# Patient Record
Sex: MALE | Race: WHITE | NOT HISPANIC OR LATINO | URBAN - METROPOLITAN AREA
[De-identification: names, ages, dates, MRNs, and addresses within clinical notes are randomized per-mention and may not be internally consistent; named-entity substitution may affect disease eponyms.]

---

## 2020-09-02 ENCOUNTER — OFFICE VISIT (OUTPATIENT)
Dept: URBAN - METROPOLITAN AREA TELEHEALTH 2 | Facility: TELEHEALTH | Age: 70
End: 2020-09-02

## 2020-09-03 ENCOUNTER — OFFICE VISIT (OUTPATIENT)
Dept: URBAN - METROPOLITAN AREA TELEHEALTH 2 | Facility: TELEHEALTH | Age: 70
End: 2020-09-03

## 2020-09-03 RX ORDER — INSULIN GLARGINE 100 [IU]/ML
INJECTION, SOLUTION SUBCUTANEOUS
Qty: 0 | Refills: 0 | Status: ACTIVE | COMMUNITY
Start: 1900-01-01 | End: 1900-01-01

## 2020-09-03 RX ORDER — ELVITEGRAVIR, COBICISTAT, EMTRICITABINE, AND TENOFOVIR ALAFENAMIDE 150; 150; 200; 10 MG/1; MG/1; MG/1; MG/1
TAKE 1 TABLET BY ORAL ROUTE ONCE DAILY WITH FOOD TABLET ORAL 1
Qty: 0 | Refills: 0 | Status: ACTIVE | COMMUNITY
Start: 1900-01-01 | End: 1900-01-01

## 2020-10-02 ENCOUNTER — OFFICE VISIT (OUTPATIENT)
Dept: URBAN - METROPOLITAN AREA CLINIC 84 | Facility: CLINIC | Age: 70
End: 2020-10-02

## 2020-10-23 ENCOUNTER — OFFICE VISIT (OUTPATIENT)
Dept: URBAN - METROPOLITAN AREA CLINIC 84 | Facility: CLINIC | Age: 70
End: 2020-10-23

## 2020-11-10 ENCOUNTER — WEB ENCOUNTER (OUTPATIENT)
Dept: URBAN - METROPOLITAN AREA CLINIC 84 | Facility: CLINIC | Age: 70
End: 2020-11-10

## 2020-11-10 ENCOUNTER — OFFICE VISIT (OUTPATIENT)
Dept: URBAN - METROPOLITAN AREA CLINIC 84 | Facility: CLINIC | Age: 70
End: 2020-11-10
Payer: MEDICARE

## 2020-11-10 DIAGNOSIS — K72.90 PORTOSYSTEMIC ENCEPHALOPATHY: ICD-10-CM

## 2020-11-10 DIAGNOSIS — K76.6 PORTAL HYPERTENSION: ICD-10-CM

## 2020-11-10 DIAGNOSIS — B20 HIV (HUMAN IMMUNODEFICIENCY VIRUS INFECTION): ICD-10-CM

## 2020-11-10 PROBLEM — 86406008 HUMAN IMMUNODEFICIENCY VIRUS INFECTION: Status: ACTIVE | Noted: 2020-11-10

## 2020-11-10 PROCEDURE — 3017F COLORECTAL CA SCREEN DOC REV: CPT | Performed by: INTERNAL MEDICINE

## 2020-11-10 PROCEDURE — 99214 OFFICE O/P EST MOD 30 MIN: CPT | Performed by: INTERNAL MEDICINE

## 2020-11-10 PROCEDURE — G9903 PT SCRN TBCO ID AS NON USER: HCPCS | Performed by: INTERNAL MEDICINE

## 2020-11-10 RX ORDER — ELVITEGRAVIR, COBICISTAT, EMTRICITABINE, AND TENOFOVIR ALAFENAMIDE 150; 150; 200; 10 MG/1; MG/1; MG/1; MG/1
TAKE 1 TABLET BY ORAL ROUTE ONCE DAILY WITH FOOD TABLET ORAL 1
Qty: 0 | Refills: 0 | Status: ACTIVE | COMMUNITY
Start: 1900-01-01

## 2020-11-10 RX ORDER — INSULIN GLARGINE 100 [IU]/ML
INJECTION, SOLUTION SUBCUTANEOUS
Qty: 0 | Refills: 0 | Status: ACTIVE | COMMUNITY
Start: 1900-01-01

## 2020-11-10 NOTE — HPI-TODAY'S VISIT:
Mr Barroso returns for a f/u visit. 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 He is s/p TIPS several years ago. EGD ast year showed small EV. Colonoscopy in 2019 showed rectal varices, no polyps; next colonoscopy due in 2022.  No bleeding, jaundice or ascites. He is on Lactulose and Xifaxan and his HE is controlled.  [facility] most of [facility] including [facility] and [facility] [place] but reports he has not been able to get in to get these tests done due to the pandemic.

## 2020-11-16 ENCOUNTER — OFFICE VISIT (OUTPATIENT)
Dept: URBAN - METROPOLITAN AREA CLINIC 83 | Facility: CLINIC | Age: 70
End: 2020-11-16

## 2020-11-17 LAB
A/G RATIO: 3.6
AFP, SERUM, TUMOR MARKER: 1.6
ALBUMIN: 4
ALKALINE PHOSPHATASE: 72
ALT (SGPT): 24
AST (SGOT): 26
BANDS: (no result)
BASO (ABSOLUTE): 0.1
BASOS: 2
BILIRUBIN, TOTAL: 1.2
BLASTS/BLAST LIKE CELLS: (no result)
BUN/CREATININE RATIO: 10
BUN: 15
CALCIUM: 9.6
CARBON DIOXIDE, TOTAL: 21
CHLORIDE: 109
CREATININE: 1.44
EGFR IF AFRICN AM: 56
EGFR IF NONAFRICN AM: 48
EOS (ABSOLUTE): 0.1
EOS: 4
GLOBULIN, TOTAL: 1.1
GLUCOSE: 168
HEMATOCRIT: 41.7
HEMATOLOGY COMMENTS:: (no result)
HEMOGLOBIN: 14.3
IMMATURE CELLS: (no result)
IMMATURE GRANS (ABS): (no result)
IMMATURE GRANULOCYTES: (no result)
INR: 1.1
LYMPHS (ABSOLUTE): 1
LYMPHS: 28
MCH: 31.6
MCHC: 34.3
MCV: 92
MEGAKARYOCYTES: (no result)
METAMYELOCYTES: (no result)
MONOCYTES(ABSOLUTE): 0.2
MONOCYTES: 6
MYELOCYTES: 1
NEUTROPHILS (ABSOLUTE): 2.1
NEUTROPHILS: 59
NRBC: (no result)
OTHER, LINEAGE UNCERTAIN: (no result)
PLATELETS: 70
POTASSIUM: 4.8
PROMYELOCYTES: (no result)
PROTEIN, TOTAL: 5.1
PROTHROMBIN TIME: 11.9
RBC: 4.53
RDW: 13
SODIUM: 144
WBC: 3.5

## 2020-12-09 ENCOUNTER — OFFICE VISIT (OUTPATIENT)
Dept: URBAN - METROPOLITAN AREA TELEHEALTH 2 | Facility: TELEHEALTH | Age: 70
End: 2020-12-09

## 2020-12-09 RX ORDER — ELVITEGRAVIR, COBICISTAT, EMTRICITABINE, AND TENOFOVIR ALAFENAMIDE 150; 150; 200; 10 MG/1; MG/1; MG/1; MG/1
TAKE 1 TABLET BY ORAL ROUTE ONCE DAILY WITH FOOD TABLET ORAL 1
Qty: 0 | Refills: 0 | Status: ACTIVE | COMMUNITY
Start: 1900-01-01

## 2020-12-09 RX ORDER — INSULIN GLARGINE 100 [IU]/ML
INJECTION, SOLUTION SUBCUTANEOUS
Qty: 0 | Refills: 0 | Status: ACTIVE | COMMUNITY
Start: 1900-01-01

## 2020-12-14 ENCOUNTER — OFFICE VISIT (OUTPATIENT)
Dept: URBAN - METROPOLITAN AREA CLINIC 83 | Facility: CLINIC | Age: 70
End: 2020-12-14
Payer: MEDICARE

## 2020-12-14 DIAGNOSIS — K76.6 PORTAL HYPERTENSION: ICD-10-CM

## 2020-12-14 PROCEDURE — 76705 ECHO EXAM OF ABDOMEN: CPT | Performed by: INTERNAL MEDICINE

## 2020-12-19 ENCOUNTER — TELEPHONE ENCOUNTER (OUTPATIENT)
Dept: URBAN - METROPOLITAN AREA CLINIC 92 | Facility: CLINIC | Age: 70
End: 2020-12-19

## 2020-12-19 PROBLEM — 300331000 LESION OF LIVER: Status: ACTIVE | Noted: 2020-12-19

## 2021-01-26 ENCOUNTER — DASHBOARD ENCOUNTERS (OUTPATIENT)
Age: 71
End: 2021-01-26

## 2021-01-26 ENCOUNTER — OFFICE VISIT (OUTPATIENT)
Dept: URBAN - METROPOLITAN AREA CLINIC 84 | Facility: CLINIC | Age: 71
End: 2021-01-26
Payer: MEDICARE

## 2021-01-26 DIAGNOSIS — K76.6 PORTAL HYPERTENSION: ICD-10-CM

## 2021-01-26 DIAGNOSIS — K72.90 PORTOSYSTEMIC ENCEPHALOPATHY: ICD-10-CM

## 2021-01-26 DIAGNOSIS — R06.2 WHEEZING: ICD-10-CM

## 2021-01-26 DIAGNOSIS — R05 CHRONIC COUGH: ICD-10-CM

## 2021-01-26 PROBLEM — 34742003 PORTAL HYPERTENSION: Status: ACTIVE | Noted: 2020-11-10

## 2021-01-26 PROCEDURE — 1036F TOBACCO NON-USER: CPT | Performed by: INTERNAL MEDICINE

## 2021-01-26 PROCEDURE — G8420 CALC BMI NORM PARAMETERS: HCPCS | Performed by: INTERNAL MEDICINE

## 2021-01-26 PROCEDURE — G8427 DOCREV CUR MEDS BY ELIG CLIN: HCPCS | Performed by: INTERNAL MEDICINE

## 2021-01-26 PROCEDURE — G8482 FLU IMMUNIZE ORDER/ADMIN: HCPCS | Performed by: INTERNAL MEDICINE

## 2021-01-26 PROCEDURE — 3017F COLORECTAL CA SCREEN DOC REV: CPT | Performed by: INTERNAL MEDICINE

## 2021-01-26 PROCEDURE — 99214 OFFICE O/P EST MOD 30 MIN: CPT | Performed by: INTERNAL MEDICINE

## 2021-01-26 RX ORDER — BENZONATATE 100 MG/1
1 CAPSULE AS NEEDED CAPSULE ORAL THREE TIMES A DAY
Qty: 90 CAPSULE | Refills: 1 | OUTPATIENT

## 2021-01-26 RX ORDER — ELVITEGRAVIR, COBICISTAT, EMTRICITABINE, AND TENOFOVIR ALAFENAMIDE 150; 150; 200; 10 MG/1; MG/1; MG/1; MG/1
TAKE 1 TABLET BY ORAL ROUTE ONCE DAILY WITH FOOD TABLET ORAL 1
Qty: 0 | Refills: 0 | Status: ACTIVE | COMMUNITY
Start: 1900-01-01

## 2021-01-26 RX ORDER — INSULIN GLARGINE 100 [IU]/ML
INJECTION, SOLUTION SUBCUTANEOUS
Qty: 0 | Refills: 0 | Status: ACTIVE | COMMUNITY
Start: 1900-01-01

## 2021-01-26 NOTE — HPI-TODAY'S VISIT:
Patient presents with c/o chronic cough mostly at night but also occurs during the day as well. Sxs have been present for yrs. Pt admits to prior hx of tobacco use.

## 2021-03-09 ENCOUNTER — OFFICE VISIT (OUTPATIENT)
Dept: URBAN - METROPOLITAN AREA CLINIC 84 | Facility: CLINIC | Age: 71
End: 2021-03-09

## 2023-06-02 NOTE — PHYSICAL EXAM CHEST:
breathing is unlabored without accessory muscle use. A-T Advancement Flap Text: The defect edges were debeveled with a #15 scalpel blade.  Given the location of the defect, shape of the defect and the proximity to free margins an A-T advancement flap was deemed most appropriate.  Using a sterile surgical marker, an appropriate advancement flap was drawn incorporating the defect and placing the expected incisions within the relaxed skin tension lines where possible.    The area thus outlined was incised deep to adipose tissue with a #15 scalpel blade.  The skin margins were undermined to an appropriate distance in all directions utilizing iris scissors.